# Patient Record
Sex: FEMALE | Race: WHITE | NOT HISPANIC OR LATINO | Employment: FULL TIME | URBAN - METROPOLITAN AREA
[De-identification: names, ages, dates, MRNs, and addresses within clinical notes are randomized per-mention and may not be internally consistent; named-entity substitution may affect disease eponyms.]

---

## 2018-06-04 ENCOUNTER — OCCUPATIONAL MEDICINE (OUTPATIENT)
Dept: URGENT CARE | Facility: CLINIC | Age: 23
End: 2018-06-04
Payer: COMMERCIAL

## 2018-06-04 VITALS
SYSTOLIC BLOOD PRESSURE: 106 MMHG | OXYGEN SATURATION: 100 % | BODY MASS INDEX: 26.68 KG/M2 | HEART RATE: 68 BPM | RESPIRATION RATE: 12 BRPM | DIASTOLIC BLOOD PRESSURE: 72 MMHG | HEIGHT: 66 IN | WEIGHT: 166 LBS | TEMPERATURE: 97.6 F

## 2018-06-04 DIAGNOSIS — S61.412A LACERATION OF LEFT PALM, INITIAL ENCOUNTER: ICD-10-CM

## 2018-06-04 PROCEDURE — 12002 RPR S/N/AX/GEN/TRNK2.6-7.5CM: CPT | Performed by: FAMILY MEDICINE

## 2018-06-04 NOTE — LETTER
"EMPLOYEE’S CLAIM FOR COMPENSATION/ REPORT OF INITIAL TREATMENT  FORM C-4    EMPLOYEE’S CLAIM - PROVIDE ALL INFORMATION REQUESTED   First Name  Dorothy Last Name  Davidson Birthdate                    1995                Sex  female Claim Number   Home Address  [address] Age  22 y.o. Height  1.676 m (5' 6\") Weight  75.3 kg (166 lb) [facility]     [facility] Zip  [address] Telephone  [phone] (home) [phone] (work)   Mailing Address  [address] [facility] Zip  [address] Primary Language Spoken  English    Insurer   Third Party       Employee's Occupation (Job Title) When Injury or Occupational Disease Occurred      Employer's Name  [employer]  Telephone      Employer Address  [address] [facility]  [address]    Date of Injury  6/4/2018               Hour of Injury  9:00 AM Date Employer Notified  6/4/2018 Last Day of Work after Injury or Occupational Disease  6/4/2018 Supervisor to Whom Injury Reported  Sung Moses   Address or Location of Accident (if applicable)  [[address] [place] [address]]   What were you doing at the time of accident? (if applicable)  Opening a Box    How did this injury or occupational disease occur? (Be specific an answer in detail. Use additional sheet if necessary)  A safety  while opening a box   If you believe that you have an occupational disease, when did you first have knowledge of the disability and it relationship to your employment?  NA Witnesses to the Accident  Sung Brantley      Nature of Injury or Occupational Disease  Laceration  Part(s) of Body Injured or Affected  Hand (L), N/A, N/A    I certify that the above is true and correct to the best of my knowledge and that I have provided this information in order to obtain the benefits of [place] inclusive [place]).  I hereby authorize any physician, chiropractor, surgeon, practitioner, or other person, any hospital, including [facility] or [facility] hospital, any medical service organization, any insurance company, or other institution or organization to release to each other, any medical or other information, including benefits paid or payable, pertinent to this injury or disease, except information relative to diagnosis, treatment and/or counseling for AIDS, psychological conditions, alcohol or controlled substances, for which I must give specific authorization.  A Photostat of this authorization shall be as valid as the original.     Date   Place   Employee’s Signature   THIS REPORT MUST BE COMPLETED AND MAILED WITHIN 3 WORKING DAYS OF TREATMENT   Place  [facility]  Name of Facility  [facility]   Date  6/4/2018 Diagnosis  (S61.412A) Laceration of left palm, initial encounter Is there evidence the injured employee was under the influence of alcohol and/or another controlled substance at the time of accident?   Hour  5:29 PM Description of Injury or Disease  The encounter diagnosis was Laceration of left palm, initial encounter. No   Treatment  Sutures placed for approximation wound care instructions given  Have you advised the patient to remain off work five days or more? No   X-Ray Findings      If Yes   From Date  To Date      From information given by the employee, together with medical evidence, can you directly connect this injury or occupational disease as job incurred?  Yes If No Full Duty  No Modified Duty  Yes   Is additional medical care by a physician indicated?  Yes If Modified Duty, Specify any Limitations / Restrictions  Keep the wound clean and covered and dry   Do you know of any previous injury or disease contributing to this condition or occupational disease?                            No   Date  6/4/2018 Print Doctor’s Name Kamini Monique,  "D.O. I certify the employer’s copy of  this form was mailed on:   Address  [address] Insurer’s Use Only     [facility] Zip  [address]    Provider’s Tax ID Number  [id number] Telephone  Dept: [phone]        kumar-ZHANG Blanton D.O.   e-Signature: Dr. Jim Leigh, Medical Director Degree  DO        ORIGINAL-TREATING PHYSICIAN OR CHIROPRACTOR    PAGE 2-INSURER/TPA    PAGE 3-EMPLOYER    PAGE 4-EMPLOYEE             Form C-4 (rev10/07)              BRIEF DESCRIPTION OF RIGHTS AND BENEFITS  (Pursuant to [place] 616C.050)    Notice of Injury or Occupational Disease (Incident Report Form C-1): If an injury or occupational disease (OD) arises out of and in the  course of employment, you must provide written notice to your employer as soon as practicable, but no later than 7 days after the accident or  OD. Your employer shall maintain a sufficient supply of the required forms.    Claim for Compensation (Form C-4): If medical treatment is sought, the form C-4 is available at the place of initial treatment. A completed  \"Claim for Compensation\" (Form C-4) must be filed within 90 days after an accident or OD. The treating physician or chiropractor must,  within 3 working days after treatment, complete and mail to the employer, the employer's insurer and third-party , the Claim for  Compensation.    Medical Treatment: If you require medical treatment for your on-the-job injury or OD, you may be required to select a physician or  chiropractor from a list provided by your workers’ compensation insurer, if it has contracted with an Organization for Managed Care (MCO) or  Preferred Provider Organization (PPO) or providers of health care. If your employer has not entered into a contract with an MCO or PPO, you  may select a physician or chiropractor from the Panel of Physicians and Chiropractors. Any medical costs related to your industrial injury or  OD will be paid by your " insurer.    Temporary Total Disability (TTD): If your doctor has certified that you are unable to work for a period of at least 5 consecutive days, or 5  cumulative days in a 20-day period, or places restrictions on you that your employer does not accommodate, you may be entitled to TTD  compensation.    Temporary Partial Disability (TPD): If the wage you receive upon reemployment is less than the compensation for TTD to which you are  entitled, the insurer may be required to pay you TPD compensation to make up the difference. TPD can only be paid for a maximum of 24  months.    Permanent Partial Disability (PPD): When your medical condition is stable and there is an indication of a PPD as a result of your injury or  OD, within 30 days, your insurer must arrange for an evaluation by a rating physician or chiropractor to determine the degree of your PPD. The  amount of your PPD award depends on the date of injury, the results of the PPD evaluation and your age and wage.    Permanent Total Disability (PTD): If you are medically certified by a treating physician or chiropractor as permanently and totally disabled  and have been granted a PTD status by your insurer, you are entitled to receive monthly benefits not to exceed 66 2/3% of your average  monthly wage. The amount of your PTD payments is subject to reduction if you previously received a PPD award.    Vocational Rehabilitation Services: You may be eligible for vocational rehabilitation services if you are unable to return to the job due to a  permanent physical impairment or permanent restrictions as a result of your injury or occupational disease.    Transportation and Per Marlon Reimbursement: You may be eligible for travel expenses and per marlon associated with medical treatment.    Reopening: You may be able to reopen your claim if your condition worsens after claim closure.    Appeal Process: If you disagree with a written determination issued by the insurer  or the insurer does not respond to your request, you may  appeal to the Department of Administration, , by following the instructions contained in your determination letter. You must  appeal the determination within 70 days from the date of the determination letter at [address], [address], [place], [place]  [address], or [address], [address], [place], [place] [address]. If you disagree with the  decision, you may appeal to the  Department of Administration, . You must file your appeal within 30 days from the date of the  decision  letter at [address], [address], [place], [place] [address], or [address], [place], [place] [address]. If you  disagree with a decision of an , you may file a petition for judicial review with the District Court. You must do so within 30  days of the Appeal Officer’s decision. You may be represented by an  at your own expense or you may contact the [facility] for possible  representation.    [place]  for Injured Workers (NAIW): If you disagree with a  decision, you may request that NAIW represent you  without charge at an  Hearing. For information regarding denial of benefits, you may contact the [facility] at: [address], [address], [place] [address], [phone], or [address], [place] [address], [phone]    To File a Complaint with the Division: If you wish to file a complaint with the  of the Division of Industrial Relations (DIR),  please contact the Workers’ Compensation Section, [address], [address], [place], [place] [address], telephone [phone], or  [address], [place], [place] [address], telephone [phone].    For assistance with Workers’ Compensation Issues: you may contact the Office of the Governor Consumer  Health Assistance, [address], [address], [place], [place] [address], Toll Free 1-305.666.8165, Web site: [email or web address], E-mail  [email or web address]                                                                                                                                                                                                                                   __________________________________________________________________                                                                   _________________                Employee Name / Signature                                                                                                                                                       Date                                                                                                                                                                                                     D-2 (rev. 10/07)

## 2018-06-04 NOTE — LETTER
[facility] [address] [place] [address]  Phone:  [phone] - Fax:  [phone]   [facility] Progress Report and Disability Certification  Date of Service: 6/4/2018   No Show:  No  Date / Time of Next Visit: 6/8/2018   Claim Information   Patient Name: Dorothy Cedeño  Claim Number:     Employer: [employer]  Date of Injury: 6/4/2018     Insurer / TPA:    ID / SSN:     Occupation:   Diagnosis: The encounter diagnosis was Laceration of left palm, initial encounter.    Medical Information   Related to Industrial Injury? Yes    Subjective Complaints:  DOI: 6/4/18 patient was working at [employer] in the process of using a  to open a box when she accidentally lacerated her left hand. She is right hand dominant. Denies any numbness or tingling immediately had bleeding pain has been minimal her tetanus is up-to-date. No numbness tingling or weakness distally.   Objective Findings: Physical Exam   Constitutional: She is oriented to person, place, and time. She appears well-developed and well-nourished. No distress.   HENT:   Mouth/Throat: Oropharynx is clear and moist.   Eyes: Conjunctivae are normal.   Neck: Normal range of motion.   Cardiovascular: Normal rate.    Pulmonary/Chest: Effort normal.   Musculoskeletal: Normal range of motion. She exhibits edema and tenderness. She exhibits no deformity.        Left hand: She exhibits tenderness, laceration and swelling. She exhibits normal range of motion. Normal sensation noted. Normal strength noted.   Laceration 2.7cm along palmar aspect of left hand no active bleeding ttp   Neurological: She is alert and oriented to person, place, and time.   Skin: Skin is warm and dry. She is not diaphoretic. No erythema.   Psychiatric: She has a normal mood and affect. Her behavior is normal.      Pre-Existing Condition(s):     Assessment:   Initial Visit    Status: Additional Care  Required  Permanent Disability:No    Plan:      Diagnostics:      Comments:       Disability Information   Status: Released to Restricted Duty    From:  6/4/2018  Through: 6/8/2018 Restrictions are: Temporary   Physical Restrictions   Sitting:    Standing:    Stooping:    Bending:      Squatting:    Walking:    Climbing:    Pushing:      Pulling:    Other:    Reaching Above Shoulder (L):   Reaching Above Shoulder (R):       Reaching Below Shoulder (L):    Reaching Below Shoulder (R):      Not to exceed Weight Limits   Carrying(hrs):   Weight Limit(lb): < or = to 25 pounds  Comments:left hand Lifting(hrs):   Weight  Limit(lb): < or = to 25 pounds  Comments:left hand   Comments: Patient is advised to keep her left hand clean and dry and covered at work for the duration that the sutures are in place.    Repetitive Actions   Hands: i.e. Fine Manipulations from Grasping:     Feet: i.e. Operating Foot Controls:     Driving / Operate Machinery:     Physician Name: Zhang Monique D.O. Physician Signature: ZHANG Mora D.O. e-Signature: Dr. Jim Leigh, Medical Director   Clinic Name / Location: [address] [place] [address] Clinic Phone Number: Dept: [phone]   Appointment Time: 5:15 Pm Visit Start Time: 5:29 PM   Check-In Time:  5:08 Pm Visit Discharge Time:  6:16 PM   Original-Treating Physician or Chiropractor    Page 2-Insurer/TPA    Page 3-Employer    Page 4-Employee

## 2018-06-05 NOTE — PROGRESS NOTES
"Subjective:      Dorothy Cedeño is a 22 y.o. female who presents with Laceration (left hand)      DOI: 6/4/18 patient was working at [employer] in the process of using a  to open a box when she accidentally lacerated her left hand. She is right hand dominant. Denies any numbness or tingling immediately had bleeding pain has been minimal her tetanus is up-to-date. No numbness tingling or weakness distally.     HPI  ROS Review of Systems performed. All other systems are negative except for what is listed above.       PMH:  has a past medical history of UTI (urinary tract infection).  MEDS:   Current Outpatient Prescriptions:   •  cetirizine (ZYRTEC) 10 MG TABS, Take 1 Tab by mouth every day., Disp: 30 Tab, Rfl: 0  •  amoxicillin-clavulanate (AUGMENTIN) 875-125 MG TABS, Take 1 Tab by mouth 2 times a day., Disp: 20 Tab, Rfl: 0  •  levonorgestrel-ethinyl estradiol (LUTERA) 0.1-20 MG-MCG per tablet, Take 1 Tab by mouth every day., Disp: , Rfl:   ALLERGIES: No Known Allergies  SURGHX: History reviewed. No pertinent surgical history.  SOCHX:  reports that she has never smoked. She has never used smokeless tobacco. She reports that she drinks alcohol. She reports that she uses drugs, including Marijuana.  FH: Family history was reviewed, no pertinent findings to report   Objective:     /72   Pulse 68   Temp 36.4 °C (97.6 °F)   Resp 12   Ht 1.676 m (5' 6\")   Wt 75.3 kg (166 lb)   SpO2 100%   BMI 26.79 kg/m²      Physical Exam   Constitutional: She is oriented to person, place, and time. She appears well-developed and well-nourished. No distress.   HENT:   Mouth/Throat: Oropharynx is clear and moist.   Eyes: Conjunctivae are normal.   Neck: Normal range of motion.   Cardiovascular: Normal rate.    Pulmonary/Chest: Effort normal.   Musculoskeletal: Normal range of motion. She exhibits edema and tenderness. She exhibits no deformity.        Left hand: She exhibits tenderness, laceration and swelling. She " exhibits normal range of motion. Normal sensation noted. Normal strength noted.        Hands:  Laceration 2.7cm along palmar aspect of left hand no active bleeding ttp   Neurological: She is alert and oriented to person, place, and time.   Skin: Skin is warm and dry. She is not diaphoretic. No erythema.   Psychiatric: She has a normal mood and affect. Her behavior is normal.       Procedures: The area was wrapped in clean topical lidocaine jelly was applied to the area for anesthetized half cc of 2% lidocaine without epinephrine. After which 2 interrupted sutures were placed 5-0 on a P3 with good approximation patient tolerated well     Assessment/Plan:

## 2018-06-09 ENCOUNTER — OCCUPATIONAL MEDICINE (OUTPATIENT)
Dept: URGENT CARE | Facility: CLINIC | Age: 23
End: 2018-06-09
Payer: COMMERCIAL

## 2018-06-09 VITALS
HEIGHT: 66 IN | BODY MASS INDEX: 26.68 KG/M2 | RESPIRATION RATE: 16 BRPM | OXYGEN SATURATION: 100 % | WEIGHT: 166 LBS | TEMPERATURE: 97.7 F | SYSTOLIC BLOOD PRESSURE: 118 MMHG | HEART RATE: 74 BPM | DIASTOLIC BLOOD PRESSURE: 68 MMHG

## 2018-06-09 DIAGNOSIS — Z48.02 VISIT FOR SUTURE REMOVAL: ICD-10-CM

## 2018-06-09 DIAGNOSIS — S61.412A LACERATION OF LEFT PALM, INITIAL ENCOUNTER: ICD-10-CM

## 2018-06-09 PROCEDURE — 99213 OFFICE O/P EST LOW 20 MIN: CPT | Mod: 29 | Performed by: PHYSICIAN ASSISTANT

## 2018-06-09 ASSESSMENT — ENCOUNTER SYMPTOMS
SHORTNESS OF BREATH: 0
FEVER: 0
PALPITATIONS: 0
COUGH: 0

## 2018-06-09 NOTE — PROGRESS NOTES
"Subjective:      Dorothy Cedeño is a 22 y.o. female who presents with Follow-Up ( wound check on left wrist, hurt it at work)           HPI    DOI: 6/4/18 patient was working at [employer] in the process of using a  to open a box when she accidentally lacerated her left hand. She is right hand dominant. Denies any numbness or tingling immediately had bleeding pain has been minimal her tetanus is up-to-date. No numbness tingling or weakness distally.  2 Sutures placed and wound is well healing.        Review of Systems   Constitutional: Negative for fever and malaise/fatigue.   Respiratory: Negative for cough and shortness of breath.    Cardiovascular: Negative for chest pain and palpitations.   Skin:        Laceration of left palm/wrist.   All other systems reviewed and are negative.    PMH:  has a past medical history of UTI (urinary tract infection).  MEDS:   Current Outpatient Prescriptions:   •  cetirizine (ZYRTEC) 10 MG TABS, Take 1 Tab by mouth every day., Disp: 30 Tab, Rfl: 0  •  amoxicillin-clavulanate (AUGMENTIN) 875-125 MG TABS, Take 1 Tab by mouth 2 times a day., Disp: 20 Tab, Rfl: 0  •  levonorgestrel-ethinyl estradiol (LUTERA) 0.1-20 MG-MCG per tablet, Take 1 Tab by mouth every day., Disp: , Rfl:   ALLERGIES: No Known Allergies  SURGHX: No past surgical history on file.  SOCHX:  reports that she has never smoked. She has never used smokeless tobacco. She reports that she drinks alcohol. She reports that she uses drugs, including Marijuana.  FH: Family history was reviewed, no pertinent findings to report  Medications, Allergies, and current problem list reviewed today in Epic       Objective:     /68   Pulse 74   Temp 36.5 °C (97.7 °F)   Resp 16   Ht 1.676 m (5' 6\")   Wt 75.3 kg (166 lb)   SpO2 100%   Breastfeeding? No   BMI 26.79 kg/m²      Physical Exam    Constitutional: Pt is oriented to person, place, and time. He appears well-developed and well-nourished. No distress.   HENT: "   Mouth/Throat: Oropharynx is clear and moist.   Eyes: Conjunctivae are normal.   Cardiovascular: Normal rate.    Pulmonary/Chest: Effort normal.   Musculoskeletal:   Neurological: Pt is alert and oriented to person, place, and time. Coordination normal.   Skin: Well healing laceration of the palm/wrist Left.  Sutures taken out with no dehiscence.     Psychiatric: Pt has a normal mood and affect. Her behavior is normal.          Assessment/Plan:     1. Laceration of left palm, initial encounter  - Discharge MMI    2. Visit for suture removal  - 2 suture removed with no dehiscence  - Dermabond placed for reinforcement

## 2018-06-09 NOTE — LETTER
[facility] [address] [place] [address]  Phone:  [phone] - Fax:  [phone]   [facility] Progress Report and Disability Certification  Date of Service: 6/9/2018   No Show:  No  Date / Time of Next Visit:  DISCHARGE   Claim Information   Patient Name: Dorothy Cedeño  Claim Number:     Employer: [employer]  Date of Injury: 6/4/2018     Insurer / TPA: [employer]  ID / SSN:     Occupation:   Diagnosis: Diagnoses of Laceration of left palm, initial encounter and Visit for suture removal were pertinent to this visit.    Medical Information   Related to Industrial Injury? Yes   Subjective Complaints:  DOI: 6/4/18 patient was working at [employer] in the process of using a  to open a box when she accidentally lacerated her left hand. She is right hand dominant. Denies any numbness or tingling immediately had bleeding pain has been minimal her tetanus is up-to-date. No numbness tingling or weakness distally.  2 Sutures placed and wound is well healing.      Objective Findings: Constitutional: Pt is oriented to person, place, and time. He appears well-developed and well-nourished. No distress.   HENT:   Mouth/Throat: Oropharynx is clear and moist.   Eyes: Conjunctivae are normal.   Cardiovascular: Normal rate.    Pulmonary/Chest: Effort normal.   Musculoskeletal:   Neurological: Pt is alert and oriented to person, place, and time. Coordination normal.   Skin: Well healing laceration of the palm/wrist Left.  Sutures taken out with no dehiscence.     Psychiatric: Pt has a normal mood and affect. Her behavior is normal.      Pre-Existing Condition(s):     Assessment:   Condition Improved    Status: Discharged /  MMI  Permanent Disability:No    Plan:      Diagnostics:      Comments:       Disability Information   Status: Released to Full Duty    From:     Through:   Restrictions are:     Physical Restrictions      Sitting:    Standing:    Stooping:    Bending:      Squatting:    Walking:    Climbing:    Pushing:      Pulling:    Other:    Reaching Above Shoulder (L):   Reaching Above Shoulder (R):       Reaching Below Shoulder (L):    Reaching Below Shoulder (R):      Not to exceed Weight Limits   Carrying(hrs):   Weight Limit(lb):   Lifting(hrs):   Weight  Limit(lb):     Comments:      Repetitive Actions   Hands: i.e. Fine Manipulations from Grasping:     Feet: i.e. Operating Foot Controls:     Driving / Operate Machinery:     Physician Name: Jamal Conti P.A.-C. Physician Signature: carrollSignJAMAL CONTI P.A.-C. e-Signature: Dr. Jim Leigh, Medical Director   Clinic Name / Location: [address] [place] [address] Clinic Phone Number: Dept: [phone]   Appointment Time: 9:00 Am Visit Start Time: 9:05 AM   Check-In Time:  9:01 Am Visit Discharge Time: 9:50 AM   Original-Treating Physician or Chiropractor    Page 2-Insurer/TPA    Page 3-Employer    Page 4-Employee

## 2018-10-03 ENCOUNTER — OFFICE VISIT (OUTPATIENT)
Dept: MEDICAL GROUP | Facility: MEDICAL CENTER | Age: 23
End: 2018-10-03
Payer: COMMERCIAL

## 2018-10-03 VITALS
RESPIRATION RATE: 14 BRPM | OXYGEN SATURATION: 98 % | SYSTOLIC BLOOD PRESSURE: 122 MMHG | HEIGHT: 66 IN | WEIGHT: 161 LBS | TEMPERATURE: 98.3 F | BODY MASS INDEX: 25.88 KG/M2 | DIASTOLIC BLOOD PRESSURE: 72 MMHG | HEART RATE: 74 BPM

## 2018-10-03 DIAGNOSIS — R53.83 OTHER FATIGUE: ICD-10-CM

## 2018-10-03 DIAGNOSIS — R45.89 DEPRESSED MOOD: ICD-10-CM

## 2018-10-03 DIAGNOSIS — F41.9 ANXIETY: ICD-10-CM

## 2018-10-03 DIAGNOSIS — Z23 NEED FOR VACCINATION: ICD-10-CM

## 2018-10-03 DIAGNOSIS — Z97.5 IUD (INTRAUTERINE DEVICE) IN PLACE: ICD-10-CM

## 2018-10-03 DIAGNOSIS — R42 DIZZINESS: ICD-10-CM

## 2018-10-03 DIAGNOSIS — L30.9 ECZEMA, UNSPECIFIED TYPE: ICD-10-CM

## 2018-10-03 DIAGNOSIS — Z23 NEED FOR HPV VACCINE: ICD-10-CM

## 2018-10-03 PROCEDURE — 90651 9VHPV VACCINE 2/3 DOSE IM: CPT | Performed by: NURSE PRACTITIONER

## 2018-10-03 PROCEDURE — 99213 OFFICE O/P EST LOW 20 MIN: CPT | Mod: 25 | Performed by: NURSE PRACTITIONER

## 2018-10-03 PROCEDURE — 90472 IMMUNIZATION ADMIN EACH ADD: CPT | Performed by: NURSE PRACTITIONER

## 2018-10-03 PROCEDURE — 90471 IMMUNIZATION ADMIN: CPT | Performed by: NURSE PRACTITIONER

## 2018-10-03 PROCEDURE — 90686 IIV4 VACC NO PRSV 0.5 ML IM: CPT | Performed by: NURSE PRACTITIONER

## 2018-10-03 RX ORDER — TRIAMCINOLONE ACETONIDE 1 MG/G
1 CREAM TOPICAL 2 TIMES DAILY
Qty: 30 G | Refills: 3 | Status: SHIPPED | OUTPATIENT
Start: 2018-10-03

## 2018-10-03 ASSESSMENT — PATIENT HEALTH QUESTIONNAIRE - PHQ9: CLINICAL INTERPRETATION OF PHQ2 SCORE: 0

## 2018-10-03 NOTE — LETTER
[facility]  RANJIT LylesPTabbyRMONIQUE.  [address]  [place] [address]  Fax: [phone]   Authorization for Release/Disclosure of   Protected Health Information   Name: CHARLIE CEDEÑO : 1995 SSN: [id number]   Address: [address] [place] [address]  [place] [address] Phone:    [phone] (home) [phone] (work)   I authorize the entity listed below to release/disclose the PHI below to:   [facility]/RANJIT LylesP.RYESENIA and MAUREEN Lyles   Provider or Entity Name:  Dr. Hancock   Address   [facility], [facility], [facility]   Phone:      Fax:     Reason for request: continuity of care   Information to be released:    [  ] LAST COLONOSCOPY,  including any PATH REPORT and follow-up  [  ] LAST FIT/COLOGUARD RESULT [  ] LAST DEXA  [  ] LAST MAMMOGRAM  [ x ] LAST PAP  [  ] LAST LABS [  ] RETINA EXAM REPORT  [  ] IMMUNIZATION RECORDS  [  ] Release all info      [  ] Check here and initial the line next to each item to release ALL health information INCLUDING  _____ Care and treatment for drug and / or alcohol abuse  _____ HIV testing, infection status, or AIDS  _____ Genetic Testing    DATES OF SERVICE OR TIME PERIOD TO BE DISCLOSED: _____________  I understand and acknowledge that:  * This Authorization may be revoked at any time by you in writing, except if your health information has already been used or disclosed.  * Your health information that will be used or disclosed as a result of you signing this authorization could be re-disclosed by the recipient. If this occurs, your re-disclosed health information may no longer be protected by State or Federal laws.  * You may refuse to sign this Authorization. Your refusal will not affect your ability to obtain treatment.  * This Authorization becomes effective upon signing and will  on (date) __________.      If no date is indicated, this Authorization will  one (1) year from the signature date.    Name: Charlie Cedeño    Signature:   Date:     10/3/2018       PLEASE FAX REQUESTED RECORDS BACK TO: [phone]

## 2018-11-13 ENCOUNTER — NON-PROVIDER VISIT (OUTPATIENT)
Dept: MEDICAL GROUP | Facility: MEDICAL CENTER | Age: 23
End: 2018-11-13
Payer: COMMERCIAL

## 2018-11-13 DIAGNOSIS — Z23 NEED FOR VACCINATION: ICD-10-CM

## 2018-11-13 PROCEDURE — 90471 IMMUNIZATION ADMIN: CPT | Performed by: NURSE PRACTITIONER

## 2018-11-13 PROCEDURE — 90651 9VHPV VACCINE 2/3 DOSE IM: CPT | Performed by: NURSE PRACTITIONER

## 2018-11-14 NOTE — NON-PROVIDER
"Dorothy Cedeño is a 23 y.o. female here for a non-provider visit for:   HPV 2 of 3    Reason for immunization: Overdue/Provider Recommended  Immunization records indicate need for vaccine: Yes, confirmed with Epic  Minimum interval has been met for this vaccine: Yes  ABN completed: Not Indicated    VIS Dated  11/13/2018 was given to patient: Yes  All IAC Questionnaire questions were answered \"No.\"    Patient tolerated injection and no adverse effects were observed or reported: Yes    Pt scheduled for next dose in series: Not Indicated  "

## 2018-11-16 ENCOUNTER — OFFICE VISIT (OUTPATIENT)
Dept: URGENT CARE | Facility: CLINIC | Age: 23
End: 2018-11-16
Payer: COMMERCIAL

## 2018-11-16 VITALS
OXYGEN SATURATION: 99 % | TEMPERATURE: 98.8 F | WEIGHT: 161.2 LBS | DIASTOLIC BLOOD PRESSURE: 82 MMHG | HEIGHT: 66 IN | BODY MASS INDEX: 25.91 KG/M2 | SYSTOLIC BLOOD PRESSURE: 120 MMHG | HEART RATE: 66 BPM

## 2018-11-16 DIAGNOSIS — R51.9 NONINTRACTABLE HEADACHE, UNSPECIFIED CHRONICITY PATTERN, UNSPECIFIED HEADACHE TYPE: ICD-10-CM

## 2018-11-16 DIAGNOSIS — R30.0 DYSURIA: ICD-10-CM

## 2018-11-16 LAB
APPEARANCE UR: NORMAL
BILIRUB UR STRIP-MCNC: NORMAL MG/DL
COLOR UR AUTO: NORMAL
GLUCOSE UR STRIP.AUTO-MCNC: NORMAL MG/DL
KETONES UR STRIP.AUTO-MCNC: NORMAL MG/DL
LEUKOCYTE ESTERASE UR QL STRIP.AUTO: NORMAL
NITRITE UR QL STRIP.AUTO: NORMAL
PH UR STRIP.AUTO: 6 [PH] (ref 5–8)
PROT UR QL STRIP: 100 MG/DL
RBC UR QL AUTO: NORMAL
SP GR UR STRIP.AUTO: 1.03
UROBILINOGEN UR STRIP-MCNC: 0.2 MG/DL

## 2018-11-16 PROCEDURE — 99214 OFFICE O/P EST MOD 30 MIN: CPT | Performed by: FAMILY MEDICINE

## 2018-11-16 PROCEDURE — 81002 URINALYSIS NONAUTO W/O SCOPE: CPT | Performed by: FAMILY MEDICINE

## 2018-11-16 RX ORDER — SULFAMETHOXAZOLE AND TRIMETHOPRIM 800; 160 MG/1; MG/1
1 TABLET ORAL EVERY 12 HOURS
Qty: 6 TAB | Refills: 0 | Status: SHIPPED | OUTPATIENT
Start: 2018-11-16 | End: 2018-11-19

## 2018-11-16 RX ORDER — PHENAZOPYRIDINE HYDROCHLORIDE 200 MG/1
200 TABLET, FILM COATED ORAL 3 TIMES DAILY
Qty: 6 TAB | Refills: 0 | Status: SHIPPED | OUTPATIENT
Start: 2018-11-16 | End: 2018-11-18

## 2018-11-16 ASSESSMENT — ENCOUNTER SYMPTOMS
COUGH: 0
ABDOMINAL PAIN: 0
VOMITING: 0
FEVER: 1
DIARRHEA: 0
MYALGIAS: 1
NAUSEA: 1
HEADACHES: 1

## 2018-11-16 NOTE — PROGRESS NOTES
"Subjective:      Dorothy Cedeño is a 23 y.o. female who presents with UTI (pain/frequent/felt like throwing up/migraine/chills X1 day )      - This is a very pleasant, well and non-toxic appearing 23 y.o. female with complaints of since yesterday feels she has a UTI. + dysuria/freq.     Also today worried she might have salmonella b/c she ate a turkey sandwich that was made w/ turkey that had been recalled. Symptoms of this today include Rt sided headache (mild w/ a little nausea and a little light sensitivity), some subjective fever and some body aches.           ALLERGIES:  Patient has no known allergies.     PMH:  Past Medical History:   Diagnosis Date   • UTI (urinary tract infection)     had a lot as a child        MEDS:    Current Outpatient Prescriptions:   •  sulfamethoxazole-trimethoprim (BACTRIM DS) 800-160 MG tablet, Take 1 Tab by mouth every 12 hours for 3 days., Disp: 6 Tab, Rfl: 0  •  phenazopyridine (PYRIDIUM) 200 MG Tab, Take 1 Tab by mouth 3 times a day for 2 days., Disp: 6 Tab, Rfl: 0  •  triamcinolone acetonide (KENALOG) 0.1 % Cream, Apply 1 g to affected area(s) 2 times a day., Disp: 30 g, Rfl: 3    ** I have documented what I find to be significant in regards to past medical, social, family and surgical history  in my HPI or under PMH/PSH/FH review section, otherwise it is contributory **           HPI    Review of Systems   Constitutional: Positive for fever.   HENT: Negative for congestion.    Respiratory: Negative for cough.    Gastrointestinal: Positive for nausea. Negative for abdominal pain, diarrhea and vomiting.   Genitourinary: Positive for dysuria and frequency.   Musculoskeletal: Positive for myalgias.   Neurological: Positive for headaches.   All other systems reviewed and are negative.         Objective:     /82   Pulse 66   Temp 37.1 °C (98.8 °F)   Ht 1.676 m (5' 5.98\")   Wt 73.1 kg (161 lb 3.2 oz)   LMP 11/08/2018   SpO2 99%   BMI 26.03 kg/m²      Physical Exam "   Constitutional: She appears well-developed. No distress.   HENT:   Head: Normocephalic and atraumatic.   Mouth/Throat: Oropharynx is clear and moist.   Eyes: Conjunctivae are normal.   Neck: Neck supple.   Cardiovascular: Regular rhythm.    No murmur heard.  Pulmonary/Chest: Effort normal and breath sounds normal. No respiratory distress.   Abdominal: Soft. There is no tenderness.   Neurological: She is alert. She exhibits normal muscle tone.   Skin: Skin is warm and dry.   Psychiatric: She has a normal mood and affect. Judgment normal.   Nursing note and vitals reviewed.              Assessment/Plan:         1. Nonintractable headache, unspecified chronicity pattern, unspecified headache type     2. Dysuria  POCT Urinalysis    sulfamethoxazole-trimethoprim (BACTRIM DS) 800-160 MG tablet    phenazopyridine (PYRIDIUM) 200 MG Tab       - rest hydrate  - says HA is mild will just otc Naproxen       Dx & d/c instructions discussed w/ patient and/or family members.     ER precautions (worsening signs symptoms and when to go to ER) discussed.    Follow up w/ PCP in 2-3 days to make sure symptoms improving and no further intervention/treatment and/or work-up needed was advised, ER if feeling worse or not improving in 2 days.    Possible side effects (i.e. Rash, GI upset/constipation, sedation, elevation of BP or sugars) of any medications given discussed.     Patient left in stable condition

## 2019-03-11 ENCOUNTER — OFFICE VISIT (OUTPATIENT)
Dept: MEDICAL GROUP | Facility: MEDICAL CENTER | Age: 24
End: 2019-03-11
Payer: COMMERCIAL

## 2019-03-11 ENCOUNTER — HOSPITAL ENCOUNTER (OUTPATIENT)
Dept: LAB | Facility: MEDICAL CENTER | Age: 24
End: 2019-03-11
Attending: NURSE PRACTITIONER
Payer: COMMERCIAL

## 2019-03-11 VITALS
TEMPERATURE: 97.9 F | HEART RATE: 76 BPM | WEIGHT: 159.8 LBS | HEIGHT: 66 IN | SYSTOLIC BLOOD PRESSURE: 110 MMHG | DIASTOLIC BLOOD PRESSURE: 74 MMHG | OXYGEN SATURATION: 96 % | BODY MASS INDEX: 25.68 KG/M2 | RESPIRATION RATE: 14 BRPM

## 2019-03-11 DIAGNOSIS — R35.89 POLYURIA: ICD-10-CM

## 2019-03-11 DIAGNOSIS — R20.9 COLD HANDS: ICD-10-CM

## 2019-03-11 DIAGNOSIS — R42 DIZZINESS: ICD-10-CM

## 2019-03-11 DIAGNOSIS — R53.83 OTHER FATIGUE: ICD-10-CM

## 2019-03-11 DIAGNOSIS — G43.009 MIGRAINE WITHOUT AURA AND WITHOUT STATUS MIGRAINOSUS, NOT INTRACTABLE: ICD-10-CM

## 2019-03-11 DIAGNOSIS — Z83.49 FAMILY HISTORY OF HYPOTHYROIDISM: ICD-10-CM

## 2019-03-11 DIAGNOSIS — R63.1 POLYDIPSIA: ICD-10-CM

## 2019-03-11 LAB
ALBUMIN SERPL BCP-MCNC: 4.8 G/DL (ref 3.2–4.9)
ALBUMIN/GLOB SERPL: 1.9 G/DL
ALP SERPL-CCNC: 55 U/L (ref 30–99)
ALT SERPL-CCNC: 11 U/L (ref 2–50)
ANION GAP SERPL CALC-SCNC: 8 MMOL/L (ref 0–11.9)
APPEARANCE UR: CLEAR
AST SERPL-CCNC: 17 U/L (ref 12–45)
BILIRUB SERPL-MCNC: 0.4 MG/DL (ref 0.1–1.5)
BILIRUB UR STRIP-MCNC: NEGATIVE MG/DL
BUN SERPL-MCNC: 12 MG/DL (ref 8–22)
CALCIUM SERPL-MCNC: 9.8 MG/DL (ref 8.5–10.5)
CHLORIDE SERPL-SCNC: 102 MMOL/L (ref 96–112)
CO2 SERPL-SCNC: 28 MMOL/L (ref 20–33)
COLOR UR AUTO: NORMAL
CREAT SERPL-MCNC: 0.85 MG/DL (ref 0.5–1.4)
ERYTHROCYTE [DISTWIDTH] IN BLOOD BY AUTOMATED COUNT: 44.5 FL (ref 35.9–50)
FERRITIN SERPL-MCNC: 12.7 NG/ML (ref 10–291)
GLOBULIN SER CALC-MCNC: 2.5 G/DL (ref 1.9–3.5)
GLUCOSE SERPL-MCNC: 74 MG/DL (ref 65–99)
GLUCOSE UR STRIP.AUTO-MCNC: NEGATIVE MG/DL
HCT VFR BLD AUTO: 44.7 % (ref 37–47)
HGB BLD-MCNC: 14.6 G/DL (ref 12–16)
IRON SATN MFR SERPL: 18 % (ref 15–55)
IRON SERPL-MCNC: 79 UG/DL (ref 40–170)
KETONES UR STRIP.AUTO-MCNC: NEGATIVE MG/DL
LEUKOCYTE ESTERASE UR QL STRIP.AUTO: NEGATIVE
MCH RBC QN AUTO: 30.2 PG (ref 27–33)
MCHC RBC AUTO-ENTMCNC: 32.7 G/DL (ref 33.6–35)
MCV RBC AUTO: 92.5 FL (ref 81.4–97.8)
NITRITE UR QL STRIP.AUTO: NEGATIVE
PH UR STRIP.AUTO: 7 [PH] (ref 5–8)
PLATELET # BLD AUTO: 314 K/UL (ref 164–446)
PMV BLD AUTO: 10.9 FL (ref 9–12.9)
POTASSIUM SERPL-SCNC: 3.7 MMOL/L (ref 3.6–5.5)
PROT SERPL-MCNC: 7.3 G/DL (ref 6–8.2)
PROT UR QL STRIP: NEGATIVE MG/DL
RBC # BLD AUTO: 4.83 M/UL (ref 4.2–5.4)
RBC UR QL AUTO: NEGATIVE
SODIUM SERPL-SCNC: 138 MMOL/L (ref 135–145)
SP GR UR STRIP.AUTO: 1
TIBC SERPL-MCNC: 441 UG/DL (ref 250–450)
TSH SERPL DL<=0.005 MIU/L-ACNC: 3.84 UIU/ML (ref 0.38–5.33)
UROBILINOGEN UR STRIP-MCNC: 0.2 MG/DL
WBC # BLD AUTO: 10.2 K/UL (ref 4.8–10.8)

## 2019-03-11 PROCEDURE — 83550 IRON BINDING TEST: CPT

## 2019-03-11 PROCEDURE — 85027 COMPLETE CBC AUTOMATED: CPT

## 2019-03-11 PROCEDURE — 80053 COMPREHEN METABOLIC PANEL: CPT

## 2019-03-11 PROCEDURE — 82728 ASSAY OF FERRITIN: CPT

## 2019-03-11 PROCEDURE — 99213 OFFICE O/P EST LOW 20 MIN: CPT | Performed by: NURSE PRACTITIONER

## 2019-03-11 PROCEDURE — 83540 ASSAY OF IRON: CPT

## 2019-03-11 PROCEDURE — 81002 URINALYSIS NONAUTO W/O SCOPE: CPT | Performed by: NURSE PRACTITIONER

## 2019-03-11 PROCEDURE — 84443 ASSAY THYROID STIM HORMONE: CPT

## 2019-03-11 PROCEDURE — 36415 COLL VENOUS BLD VENIPUNCTURE: CPT

## 2019-03-11 RX ORDER — IBUPROFEN 200 MG
200 TABLET ORAL EVERY 6 HOURS PRN
COMMUNITY

## 2019-03-11 ASSESSMENT — PATIENT HEALTH QUESTIONNAIRE - PHQ9: CLINICAL INTERPRETATION OF PHQ2 SCORE: 0

## 2019-03-12 NOTE — PROGRESS NOTES
CC: Polydipsia (and urinating a lot which she Googled and saw diabetes. Had 1 episode of bed wetting. A migraine another day. Her mother has hypothyroid and has concerns of this as well.)        HPI:     Dorothy is a [facility] patient, all problems are new to me today, presents today for the followin. Polydipsia/. Polyuria  States that one day last week she was drinking a ton of water and urinating a lot.  She states that night she woke up and realized that she did wet the bed.  She states she could not tell at first because it was no odor and it was clear.  States she had read online and saw that drinking a lot of water and urinating a lot could be related to diabetes.  She bought an over-the-counter glucometer and states that her fasting and postprandial sugars have all been around 100 or less.    3. Cold hands/Family history of hypothyroidism  Patient states she is concerned she may have an underlying thyroid problem.  States that her hands are often cold is but especially but in general she usually is colder than the people around her.  She does have a family history of hypothyroidism-with her mother.    4. Migraine without aura and without status migrainosus, not intractable  States the day after she been drinking a lot of water and urinated the bed she did have a spontaneous onset of right-sided sharp pain.  She did not have light sensitivity or changes in vision but she did have associated nausea, no emesis.  Had associated sensitivity to sound.  Took an Excedrin and it resolved, came back a little bit in the evening drink Gatorade took ibuprofen and completely resolved.      Current Outpatient Prescriptions   Medication Sig Dispense Refill   • ibuprofen (MOTRIN) 200 MG Tab Take 200 mg by mouth every 6 hours as needed.     • triamcinolone acetonide (KENALOG) 0.1 % Cream Apply 1 g to affected area(s) 2 times a day. (Patient not taking: Reported on 3/11/2019) 30 g 3     No current facility-administered  "medications for this visit.      Social History   Substance Use Topics   • Smoking status: Never Smoker   • Smokeless tobacco: Never Used   • Alcohol use Yes      Comment: socially     I reviewed patients allergies, problem list and medications today in EPIC.    ROS: Any/all pertinent positives listed in the HPI, otherwise all others reviewed are negative today.      /74 (BP Location: Left arm, Patient Position: Sitting, BP Cuff Size: Adult)   Pulse 76   Temp 36.6 °C (97.9 °F) (Temporal)   Resp 14   Ht 1.676 m (5' 6\")   Wt 72.5 kg (159 lb 12.8 oz)   SpO2 96%   BMI 25.79 kg/m²     Exam:   Gen: Alert and oriented, No apparent distress. WDWN  Psych: A+Ox3, normal affect and mood  Skin: Warm, dry and intact. Good turgor   No rashes in visible areas.  Eye: Conjunctiva clear, lids normal  ENMT: Lips without lesions, good dentition  Neck: No Lymphadenopathy, Thyromegaly, Bruits.   Trachea midline, no masses  Lungs: Clear to auscultation bilaterally, no rales or rhonchi   Unlabored respiratory effort.   CV: Regular rate and rhythm, S1, S2. No murmurs.   No Edema  No suprapubic discomfort  O point-of-care urinalysis: Normal      Assessment and Plan.   23 y.o. female with the following issues.    1. Polydipsia/ Polyuria  Clinically stable.  Urinalysis normal.  - POCT Urinalysis    3. Cold hands/Family history of hypothyroidism  Clinically stable.  We will check her thyroid, has pending labs already from her PCP    4. Migraine without aura and without status migrainosus, not intractable  Resolved.      Over 50% of this 15 minute visit was spent face to face on counseling and coordination of care regarding the above symptoms and plan of care.      "